# Patient Record
Sex: FEMALE | Race: WHITE | URBAN - NONMETROPOLITAN AREA
[De-identification: names, ages, dates, MRNs, and addresses within clinical notes are randomized per-mention and may not be internally consistent; named-entity substitution may affect disease eponyms.]

---

## 2018-05-24 ENCOUNTER — VIRTUAL VISIT (OUTPATIENT)
Dept: SLEEP MEDICINE | Facility: HOSPITAL | Age: 53
End: 2018-05-24
Attending: INTERNAL MEDICINE
Payer: COMMERCIAL

## 2018-05-24 NOTE — MR AVS SNAPSHOT
"              After Visit Summary   2018    Ashlie Luevano    MRN: [id number]           Patient Information     Date Of Birth          1965        Visit Information        Provider Department      2018 3:45 PM Rick Esteves MD [facility]         Follow-ups after your visit        Who to contact     If you have questions or need follow up information about today's clinic visit or your schedule please contact [facility] directly at [phone].  Normal or non-critical lab and imaging results will be communicated to you by [employer], letter or phone within 4 business days after the clinic has received the results. If you do not hear from us within 7 days, please contact the clinic through [employer] or phone. If you have a critical or abnormal lab result, we will notify you by phone as soon as possible.  Submit refill requests through [employer] or call your pharmacy and they will forward the refill request to us. Please allow 3 business days for your refill to be completed.          Additional Information About Your Visit        [employer]oov Information     [employer] lets you send messages to your doctor, view your test results, renew your prescriptions, schedule appointments and more. To sign up, go to [email or web address] . Click on \"Log in\" on the left side of the screen, which will take you to the Welcome page. Then click on \"Sign up Now\" on the right side of the page.     You will be asked to enter the access code listed below, as well as some personal information. Please follow the directions to create your username and password.     Your access code is: [email or web address]  Expires: 2018 11:20 AM     Your access code will  in 90 days. If you need help or a new code, please call your [place] clinic or [phone].        Care EveryWhere ID     This is your Care EveryWhere ID. This could be used by other organizations to access your [place] medical records  [id number]         Blood Pressure " from Last 3 Encounters:   No data found for BP    Weight from Last 3 Encounters:   No data found for Wt              Today, you had the following     No orders found for display       Primary Care Provider Fax #    Physician No Ref-Primary [phone]       No address on file        Equal Access to Services     DENIA TELLESYAMILA : Felix milo solorio kevin Romano, wapatda luqadaha, morista kaalkristopher brown, pebbles landersgilma . So [facility] [phone].    ATENCIÓN: Si habla español, tiene a means disposición servicios gratuitos de asistencia lingüística. Llame al [phone].    We comply with applicable federal civil rights laws and [place] laws. We do not discriminate on the basis of race, color, national origin, age, disability, sex, sexual orientation, or gender identity.            Thank you!     Thank you for choosing [facility]  for your care. Our goal is always to provide you with excellent care. Hearing back from our patients is one way we can continue to improve our services. Please take a few minutes to complete the written survey that you may receive in the mail after your visit with us. Thank you!             Your Updated Medication List - Protect others around you: Learn how to safely use, store and throw away your medicines at www.disposemymeds.org.          This list is accurate as of 5/24/18 11:59 PM.  Always use your most recent med list.                   Brand Name Dispense Instructions for use Diagnosis    albuterol 1.25 MG/3ML nebulizer solution    ACCUNEB     Take 1 ampule by nebulization every 6 hours as needed.        fluticasone 110 MCG/ACT Inhaler    FLOVENT HFA     Take 2 puffs by mouth 2 times daily.        RAMIPRIL PO      Take  by mouth.        VITAMIN D (CHOLECALCIFEROL) PO      Take  by mouth daily.

## 2018-05-29 NOTE — PROGRESS NOTES
Service Date: 2018      [facility]      PATIENT NAME:  Ashlie LANDA MR#:  [id number]   YOB: 1965   REFERRING MD:  Shelley Breyen, MD   DATE OF SERVICE:  2018      A 52-year-old patient referred by Dr. Shelley Breyen with history of loud snoring, witnessed apneas, and daytime somnolence.        Overnight 18-channel polysomnogram was done , I reviewed the raw data in detail.  Sleep efficiency was reduced at 66% with a normal sleep and prolonged REM latency.  Sleep architecture shows all stages represented and appropriate in amounts for age.  Baseline oxyhemoglobin saturation was 91%.  The ECG was monitored and no arrhythmias were seen.  There were no significant periodic limb movements noted.      The technician noted loud snoring, measured 23 apneas and 21 hypopneas early in the study.  These events were associated with EEG arousals and desats to a low of 78%.  The apnea plus hypopnea index was elevated at 41 events per hour and patient was started on nasal CPAP.  Titration was done with pressures of 5 through 13 and patient looked probably best on CPAP of 11 but still had some residual events noted.      IMPRESSION:  Obstructive sleep apnea - trial AutoPap.          ANUPAM HINTON MD             D: 2018   T: 2018   MT: CC      Name:     ASHLIE MUÑOZ   MRN:      [id number]        Account:      [id number]   :      1965           Service Date: 2018      Document: [id number]